# Patient Record
Sex: FEMALE | Race: WHITE | NOT HISPANIC OR LATINO | URBAN - METROPOLITAN AREA
[De-identification: names, ages, dates, MRNs, and addresses within clinical notes are randomized per-mention and may not be internally consistent; named-entity substitution may affect disease eponyms.]

---

## 2017-07-11 ENCOUNTER — FOLLOW UP ESTABLISHED (OUTPATIENT)
Dept: URBAN - METROPOLITAN AREA CLINIC 64 | Facility: CLINIC | Age: 67
End: 2017-07-11
Payer: MEDICARE

## 2017-07-11 PROCEDURE — 92014 COMPRE OPH EXAM EST PT 1/>: CPT | Performed by: OPTOMETRIST

## 2017-07-11 PROCEDURE — 92134 CPTRZ OPH DX IMG PST SGM RTA: CPT | Performed by: OPTOMETRIST

## 2017-07-11 ASSESSMENT — KERATOMETRY
OD: 45.09
OS: 45.34

## 2018-08-06 ENCOUNTER — FOLLOW UP ESTABLISHED (OUTPATIENT)
Dept: URBAN - METROPOLITAN AREA CLINIC 64 | Facility: CLINIC | Age: 68
End: 2018-08-06
Payer: MEDICARE

## 2018-08-06 DIAGNOSIS — H25.12 AGE-RELATED NUCLEAR CATARACT, LEFT EYE: ICD-10-CM

## 2018-08-06 PROCEDURE — 92014 COMPRE OPH EXAM EST PT 1/>: CPT | Performed by: OPTOMETRIST

## 2018-08-06 ASSESSMENT — VISUAL ACUITY
OS: 20/20
OD: 20/20

## 2018-08-06 ASSESSMENT — KERATOMETRY
OD: 45.27
OS: 45.40

## 2018-08-06 ASSESSMENT — INTRAOCULAR PRESSURE
OD: 15
OS: 18

## 2018-08-13 ENCOUNTER — FOLLOW UP ESTABLISHED (OUTPATIENT)
Dept: URBAN - METROPOLITAN AREA CLINIC 64 | Facility: CLINIC | Age: 68
End: 2018-08-13
Payer: MEDICARE

## 2018-08-13 DIAGNOSIS — H35.343 MACULAR CYST, HOLE, OR PSEUDOHOLE, BILATERAL: Primary | ICD-10-CM

## 2018-08-13 PROCEDURE — 92134 CPTRZ OPH DX IMG PST SGM RTA: CPT | Performed by: OPTOMETRIST

## 2018-08-13 PROCEDURE — 92012 INTRM OPH EXAM EST PATIENT: CPT | Performed by: OPTOMETRIST

## 2018-08-13 RX ORDER — PREDNISOLONE ACETATE 10 MG/ML
1 % SUSPENSION/ DROPS OPHTHALMIC
Qty: 1 | Refills: 0 | Status: INACTIVE
Start: 2018-08-13 | End: 2018-09-28

## 2018-08-16 ENCOUNTER — FOLLOW UP ESTABLISHED (OUTPATIENT)
Dept: URBAN - METROPOLITAN AREA CLINIC 64 | Facility: CLINIC | Age: 68
End: 2018-08-16
Payer: MEDICARE

## 2018-08-16 PROCEDURE — 92014 COMPRE OPH EXAM EST PT 1/>: CPT | Performed by: OPHTHALMOLOGY

## 2018-08-16 PROCEDURE — 92134 CPTRZ OPH DX IMG PST SGM RTA: CPT | Performed by: OPHTHALMOLOGY

## 2018-08-16 RX ORDER — OFLOXACIN 3 MG/ML
0.3 % SOLUTION/ DROPS OPHTHALMIC
Qty: 1 | Refills: 0 | Status: INACTIVE
Start: 2018-08-16 | End: 2019-01-07

## 2018-08-16 ASSESSMENT — INTRAOCULAR PRESSURE
OS: 16
OD: 17

## 2018-09-14 ENCOUNTER — Encounter (OUTPATIENT)
Dept: URBAN - METROPOLITAN AREA CLINIC 64 | Facility: CLINIC | Age: 68
End: 2018-09-14
Payer: MEDICARE

## 2018-09-14 DIAGNOSIS — H35.371 PUCKERING OF MACULA, RIGHT EYE: ICD-10-CM

## 2018-09-14 DIAGNOSIS — Z01.818 ENCOUNTER FOR OTHER PREPROCEDURAL EXAMINATION: Primary | ICD-10-CM

## 2018-09-14 PROCEDURE — 99213 OFFICE O/P EST LOW 20 MIN: CPT | Performed by: NURSE PRACTITIONER

## 2018-09-17 ENCOUNTER — SURGERY (OUTPATIENT)
Dept: URBAN - METROPOLITAN AREA SURGERY 19 | Facility: SURGERY | Age: 68
End: 2018-09-17
Payer: MEDICARE

## 2018-09-17 PROCEDURE — 67041 VIT FOR MACULAR PUCKER: CPT | Performed by: OPHTHALMOLOGY

## 2018-09-17 PROCEDURE — 66820 INCISION SECONDARY CATARACT: CPT | Performed by: OPHTHALMOLOGY

## 2018-09-18 ENCOUNTER — POST OP (OUTPATIENT)
Dept: URBAN - METROPOLITAN AREA CLINIC 44 | Facility: CLINIC | Age: 68
End: 2018-09-18

## 2018-09-18 PROCEDURE — 99024 POSTOP FOLLOW-UP VISIT: CPT | Performed by: OPTOMETRIST

## 2018-09-18 ASSESSMENT — INTRAOCULAR PRESSURE: OD: 8

## 2018-09-19 ENCOUNTER — POST OP (OUTPATIENT)
Dept: URBAN - METROPOLITAN AREA CLINIC 10 | Facility: CLINIC | Age: 68
End: 2018-09-19
Payer: MEDICARE

## 2018-09-19 PROCEDURE — 92134 CPTRZ OPH DX IMG PST SGM RTA: CPT | Performed by: OPHTHALMOLOGY

## 2018-09-19 PROCEDURE — 99024 POSTOP FOLLOW-UP VISIT: CPT | Performed by: OPHTHALMOLOGY

## 2018-09-19 ASSESSMENT — INTRAOCULAR PRESSURE
OS: 15
OD: 13

## 2018-10-01 ENCOUNTER — POST OP (OUTPATIENT)
Dept: URBAN - METROPOLITAN AREA CLINIC 64 | Facility: CLINIC | Age: 68
End: 2018-10-01

## 2018-10-01 DIAGNOSIS — Z96.1 PRESENCE OF INTRAOCULAR LENS: Primary | ICD-10-CM

## 2018-10-01 PROCEDURE — 99024 POSTOP FOLLOW-UP VISIT: CPT | Performed by: OPTOMETRIST

## 2018-10-01 ASSESSMENT — INTRAOCULAR PRESSURE: OD: 13

## 2018-10-10 ENCOUNTER — POST OP (OUTPATIENT)
Dept: URBAN - METROPOLITAN AREA CLINIC 64 | Facility: CLINIC | Age: 68
End: 2018-10-10

## 2018-10-10 PROCEDURE — 99024 POSTOP FOLLOW-UP VISIT: CPT | Performed by: OPTOMETRIST

## 2018-10-10 ASSESSMENT — INTRAOCULAR PRESSURE
OD: 13
OS: 15

## 2019-01-07 ENCOUNTER — FOLLOW UP ESTABLISHED (OUTPATIENT)
Dept: URBAN - METROPOLITAN AREA CLINIC 64 | Facility: CLINIC | Age: 69
End: 2019-01-07
Payer: MEDICARE

## 2019-01-07 PROCEDURE — 92012 INTRM OPH EXAM EST PATIENT: CPT | Performed by: OPTOMETRIST

## 2019-01-07 PROCEDURE — 92134 CPTRZ OPH DX IMG PST SGM RTA: CPT | Performed by: OPTOMETRIST

## 2019-01-14 ENCOUNTER — FOLLOW UP ESTABLISHED (OUTPATIENT)
Dept: URBAN - METROPOLITAN AREA CLINIC 64 | Facility: CLINIC | Age: 69
End: 2019-01-14
Payer: MEDICARE

## 2019-01-14 PROCEDURE — 92134 CPTRZ OPH DX IMG PST SGM RTA: CPT | Performed by: OPTOMETRIST

## 2019-01-14 PROCEDURE — 92012 INTRM OPH EXAM EST PATIENT: CPT | Performed by: OPTOMETRIST

## 2019-01-14 ASSESSMENT — INTRAOCULAR PRESSURE
OS: 17
OD: 14

## 2019-10-01 ENCOUNTER — FOLLOW UP ESTABLISHED (OUTPATIENT)
Dept: URBAN - METROPOLITAN AREA CLINIC 64 | Facility: CLINIC | Age: 69
End: 2019-10-01
Payer: MEDICARE

## 2019-10-01 PROCEDURE — 92012 INTRM OPH EXAM EST PATIENT: CPT | Performed by: OPTOMETRIST

## 2019-10-01 PROCEDURE — 92134 CPTRZ OPH DX IMG PST SGM RTA: CPT | Performed by: OPTOMETRIST

## 2019-10-01 ASSESSMENT — INTRAOCULAR PRESSURE
OS: 16
OD: 12

## 2019-12-16 ENCOUNTER — FOLLOW UP ESTABLISHED (OUTPATIENT)
Dept: URBAN - METROPOLITAN AREA CLINIC 64 | Facility: CLINIC | Age: 69
End: 2019-12-16
Payer: MEDICARE

## 2019-12-16 DIAGNOSIS — H25.812 COMBINED FORMS OF AGE-RELATED CATARACT, LEFT EYE: ICD-10-CM

## 2019-12-16 PROCEDURE — 92014 COMPRE OPH EXAM EST PT 1/>: CPT | Performed by: OPHTHALMOLOGY

## 2019-12-16 ASSESSMENT — KERATOMETRY
OD: 45.30
OS: 45.37

## 2019-12-16 ASSESSMENT — VISUAL ACUITY
OD: 20/20
OS: 20/20

## 2019-12-16 ASSESSMENT — INTRAOCULAR PRESSURE
OD: 13
OS: 16

## 2020-10-14 ENCOUNTER — FOLLOW UP ESTABLISHED (OUTPATIENT)
Dept: URBAN - METROPOLITAN AREA CLINIC 64 | Facility: CLINIC | Age: 70
End: 2020-10-14
Payer: MEDICARE

## 2020-10-14 DIAGNOSIS — H33.311 HORSESHOE TEAR OF RETINA WITHOUT DETACHMENT, RIGHT EYE: ICD-10-CM

## 2020-10-14 DIAGNOSIS — H53.8 OTHER VISUAL DISTURBANCES: Primary | ICD-10-CM

## 2020-10-14 PROCEDURE — 92014 COMPRE OPH EXAM EST PT 1/>: CPT | Performed by: OPTOMETRIST

## 2020-10-14 PROCEDURE — 92134 CPTRZ OPH DX IMG PST SGM RTA: CPT | Performed by: OPTOMETRIST

## 2020-10-14 ASSESSMENT — INTRAOCULAR PRESSURE
OS: 13
OD: 15

## 2022-04-19 ENCOUNTER — OFFICE VISIT (OUTPATIENT)
Dept: URBAN - METROPOLITAN AREA CLINIC 64 | Facility: CLINIC | Age: 72
End: 2022-04-19
Payer: MEDICARE

## 2022-04-19 DIAGNOSIS — H33.311 HORSESHOE TEAR OF RETINA WITHOUT DETACHMENT, RIGHT EYE: ICD-10-CM

## 2022-04-19 DIAGNOSIS — H53.8 OTHER VISUAL DISTURBANCES: Primary | ICD-10-CM

## 2022-04-19 DIAGNOSIS — H35.371 PUCKERING OF MACULA, RIGHT EYE: ICD-10-CM

## 2022-04-19 PROCEDURE — 92014 COMPRE OPH EXAM EST PT 1/>: CPT | Performed by: OPTOMETRIST

## 2022-04-19 PROCEDURE — 92134 CPTRZ OPH DX IMG PST SGM RTA: CPT | Performed by: OPTOMETRIST

## 2022-04-19 ASSESSMENT — INTRAOCULAR PRESSURE
OD: 12
OS: 17

## 2022-04-19 NOTE — IMPRESSION/PLAN
Impression: Puckering of macula, right eye Plan: Mild irregularity remains s/p ERM peel. Stable on OCT compared with last measurement. Ok to observe.

## 2022-04-19 NOTE — IMPRESSION/PLAN
Impression: Other visual disturbances ; OD Plan: Flashing lights persist.  Pt. ed. that eye health is normal.  Hx of superior peripheral tear with laser tx that may result in some photopsias. Ok to observe.

## 2022-04-19 NOTE — IMPRESSION/PLAN
Impression: Horseshoe tear of retina without detachment, right eye Plan: Small superior peripheral retinal tear found at time of PPVIT /ERM peel. Surrounding laser done. No further tx needed. See above.